# Patient Record
Sex: MALE | Race: BLACK OR AFRICAN AMERICAN
[De-identification: names, ages, dates, MRNs, and addresses within clinical notes are randomized per-mention and may not be internally consistent; named-entity substitution may affect disease eponyms.]

---

## 2017-01-17 NOTE — ED EAR COMPLAINT
History of Present Illness
 
General
Chief Complaint: Ear Complaints
Stated Complaint: left EAR PAIN
Source: patient
Exam Limitations: no limitations
 
Vital Signs & Intake/Output
Vital Signs & Intake/Output
 Vital Signs
 
 
Date Time Temp Pulse Resp B/P Pulse O2 O2 Flow FiO2
 
     Ox Delivery Rate 
 
01/17 1720 96.9 81 20 124/77 98 Room Air  
 
 
Allergies
Coded Allergies:
No Known Allergies (01/17/17)
 
Reconcile Medications
Amoxicillin (Amoxil) 500 MG CAP   1 TAB PO TID SINUSITIS
Amoxicillin/Potassium Clav (Augmentin 875-125 Tablet) 875 MG-125 MG TABLET   1 
TAB PO BID otitis media
Cephalexin (Keflex) 500 MG CAP   1 TAB PO TID .
Mometasone Furoate (Nasonex) 0.05 MG/Actuation SPR   2 SPRAY NICHOL DAILY SINUSITIS
Sulfamethoxazole/Trimethopri (Bactrim Ds 800 MG-160 MG) 1 TAB TAB   1 TAB PO BID
.
 
Triage Note:
TRIAGE: PT TO ER C/C L EAR PAIN X 3-4 DAYS.
CONSTANT.  TRIED TYLENOL WITH SOME TEMPORARY
RELIEF NOTED.
Triage Nurses Notes Reviewed? yes
Onset: Abrupt
Duration: day(s): (few), constant, continues in ED
Timing: recent history
Injury Environment: home
No Modifying Factors: none
HPI:
27-year-old male comes into emergency room with complaints of left ear pain is 
been going on for the past few days.  Sharp throbbing pain.  Continuous.  
Nonradiating.  Denies any fever or chills.  Denies any runny nose congestion.  
Denies any other associated symptoms.
 
Past History
 
Travel History
Traveled to [place] past 21 day No
 
Medical History
Any Pertinent Medical History? see below for history
Neurological: NONE
EENT: NONE
Cardiovascular: NONE
Respiratory: NONE
Gastrointestinal: NONE
Hepatic: NONE
Renal: NONE
Musculoskeletal: NONE
Psychiatric: NONE
Endocrine: NONE
Blood Disorders: NONE
Cancer(s): NONE
GYN/Reproductive: NONE
 
Surgical History
Surgical History: non-contributory
 
Psychosocial History
What is your primary language English
Tobacco Use: Never used
ETOH Use: occasional use
Illicit Drug Use: denies illicit drug use
 
Family History
Hx Contributory? No
 
Review of Systems
 
Review of Systems
Constitutional:
Reports: no symptoms. 
EENTM:
Reports: see HPI. 
Respiratory:
Reports: no symptoms. 
Cardiovascular:
Reports: no symptoms. 
GI:
Reports: no symptoms. 
Genitourinary:
Reports: no symptoms. 
Musculoskeletal:
Reports: no symptoms. 
Skin:
Reports: no symptoms. 
Neurological/Psychological:
Reports: no symptoms. 
Hematologic/Endocrine:
Reports: no symptoms. 
Immunologic/Allergic:
Reports: no symptoms. 
All Other Systems: Reviewed and Negative
 
Physical Exam
 
Physical Exam
General Appearance: well developed/nourished, mild distress
Head: atraumatic
Eyes:
Bilateral: normal appearance. 
Ears:
Left: Tympanic dull, Tympanic red. 
Nose: normal inspection
Mouth/Throat: normal mouth inspection
Neck: normal inspection
Cardiovascular/Respiratory: no respiratory distress
Back: normal inspection
Neurologic/Psych: awake, alert, oriented x 3, normal mood/affect
Skin: intact, normal color, warm/dry
 
Progress
Differential Diagnoses
I considered the following diagnoses in my evaluation of the patient:  Otitis 
media, otitis externa, mastoiditis, foreign body, cerumen impaction
 
Plan of Care:
Nontoxic-appearing.  In no apparent distress.  Started on Augmentin.
Initial ED EKG: none
 
Departure
 
Departure
Disposition: HOME OR SELF CARE
Condition: Stable
Clinical Impression
Primary Impression: Otitis media
Referrals:
MANJEET ORELLANA (PCP/Family)
 
Additional Instructions:
Take Augmentin as prescribed.  Follow-up with primary care doctor.  Return if 
any concerns worsening symptoms.  Motrin for pain.  dubrex ear wax 
softening drops after course of antibiotics. 
 
Please go over all results of today's visit with your primary care doctor.  
Contact your primary care doctor to let them know you were here in the emergency
room.  There may be nonspecific findings which may not be related to your visit 
today here in the emergency room but may require further evaluation and chronic 
monitoring by your primary care doctor.  If you had a laceration today the 
chance of foreign body always remains. You should follow-up with your primary 
care doctor for recheck in 3-5 days for a wound check.  If you had an x-ray done
there is a chance that a fracture could have been missed on initial read and you
should follow-up with your primary care doctor for repeat x-rays if symptoms 
persist.  If your blood pressure was elevated here in the emergency room please 
have rechecked by her primary care doctor within the next 48 hours by your 
primary care doctor.  If you were prescribed a narcotic here in the emergency 
room or any type of controlled substances you're not allowed to drive while 
taking this medication or operate any type of heavy machinery.  Narcotics can 
make you feel lightheaded dizziness nausea and can cause constipation.  You may 
need to  a stool softener.  Thank you for choosing [facility] 
emergency room.  Please return to the emergency room immediately if you have any
other concerns worsening of symptoms.
 
Departure Forms:
Customer Survey
General Discharge Information
Prescriptions:
Current Visit Scripts
Amoxicillin/Potassium Clav (Augmentin 875-125 Tablet) 1 TAB PO BID 
     #20 TAB

## 2018-04-02 ENCOUNTER — HOSPITAL ENCOUNTER (EMERGENCY)
Dept: HOSPITAL 68 - ERH | Age: 28
End: 2018-04-02
Payer: COMMERCIAL

## 2018-04-02 VITALS — BODY MASS INDEX: 38.57 KG/M2 | HEIGHT: 66 IN | WEIGHT: 240 LBS

## 2018-04-02 VITALS — SYSTOLIC BLOOD PRESSURE: 127 MMHG | DIASTOLIC BLOOD PRESSURE: 83 MMHG

## 2018-04-02 DIAGNOSIS — R31.9: Primary | ICD-10-CM

## 2018-04-02 NOTE — ED GI/GU/ABDOMINAL COMPLAINT
History of Present Illness
 
General
Chief Complaint: Male Genitourinary Problems
Stated Complaint: BLD IN URINE
Source: patient
Exam Limitations: no limitations
 
Vital Signs & Intake/Output
Vital Signs & Intake/Output
 Vital Signs
 
 
Date Time Temp Pulse Resp B/P B/P Pulse O2 O2 Flow FiO2
 
     Mean Ox Delivery Rate 
 
04/02 1123 98.8 89 16 127/83  98 Room Air  
 
 
 
Allergies
Coded Allergies:
No Known Allergies (01/17/17)
 
Reconcile Medications
No Known Home Medications
 
Triage Note:
PT PRESENTS TO THE ER STATES THAT HE WENT TO THE
 WALK IN ON THURSDAY FOR MED CARD AND THEY WOULD
 NOT GIVE IT TO HIM BECAUSE BLOOD IN URINE.  SO PT
 WAS TOLD TO DRINK WATER AND COME BACK FOR TEST SAT
 AND WALK IN STATES THAT BLOOD IS STILL IN URINE.
 PT DOES NOT HAVE A PCP SO PT PRESENTS TO THE ER TO
 GET EVALUATED FOR BLOOD IN URINE.  PT DENIES ANY
 OTHER SYMPTOMS,.
Triage Nurses Notes Reviewed? yes
Onset: Gradual
Duration: constant
Timing: recent history
Severity Numbers: 1
Location: unknown
Radiation: no radiation
HPI:
Patient is a 28-year-old male with an unremarkable past medical history who 
presents emergency room stating that he went to an urgent care facility this 
last Thursday for evaluation and medical clearance for A [employer] license medical 
card where he was asymptomatic and during the evaluation the provider noted from
a urinalysis hematuria and small amounts where he was advised to follow up on 
Saturday, patient did this and still noted the same small amounts of hematuria.
Patient does state that on Friday he had right-sided upper abdomen pain that 
lasted for about 20 minutes however described as aching sensation in which she 
has not had no symptoms since.
Patient denies any symptoms currently denies any abdominal pain nausea vomiting 
testicular pain or swelling urethral discharge hematuria dysuria or change in 
frequency of urination.
(Niraj Doyle)
 
Past History
 
Travel History
Traveled to [place] past 21 day No
 
Medical History
Any Pertinent Medical History? none
Neurological: NONE
EENT: NONE
Cardiovascular: NONE
Respiratory: NONE
Gastrointestinal: NONE
Hepatic: NONE
Renal: NONE
Musculoskeletal: NONE
Psychiatric: NONE
Endocrine: NONE
Blood Disorders: NONE
Cancer(s): NONE
GYN/Reproductive: NONE
 
Surgical History
Surgical History: tonsillectomy 10/5/17
 
Psychosocial History
What is your primary language English
Tobacco Use: Never used
 
Family History
Hx Contributory? No
(Niraj Doyle)
 
Review of Systems
 
Review of Systems
Constitutional:
Reports: no symptoms. 
EENTM:
Reports: no symptoms. 
Respiratory:
Reports: no symptoms. 
Cardiovascular:
Reports: no symptoms. 
GI:
Reports: no symptoms. 
Genitourinary:
Reports: see HPI. 
Musculoskeletal:
Reports: no symptoms. 
Skin:
Reports: no symptoms. 
Neurological/Psychological:
Reports: no symptoms. 
Hematologic/Endocrine:
Reports: no symptoms. 
Immunologic/Allergic:
Reports: no symptoms. 
All Other Systems: Reviewed and Negative
(Niraj Doyle)
 
Physical Exam
 
Physical Exam
General Appearance: no apparent distress, alert, comfortable
Head: atraumatic
Eyes:
Bilateral: normal appearance. 
Ears, Nose, Throat, Mouth: moist mucous membrane
Neck: normal inspection
Respiratory: no respiratory distress
Gastrointestinal: normal bowel sounds, soft, non-tender
Back: normal inspection, normal range of motion
Extremities: normal range of motion
 
Core Measures
ACS in differential dx? No
Sepsis Present: No
Sepsis Focused Exam Completed? No
(Niraj Doyle)
 
Progress
Differential Diagnosis: AAA, appendicitis, colon cancer, cholecystitis, 
diverticulitis, epididymitis, ischemic bowel, inflamm bowel dis, orchitis, 
pancreatitis, prostatitis, peptic ulcer, PUD/GERD, perforated viscous, 
pyelonephritis, SBO, STD, testicular torsion, ureterolithiasis, urinary 
retention, urethritis, UTI/pyelo
Plan of Care:
 Orders
 
 
Procedure Date/time Status
 
Add-on Test (ER Only) 04/02 1305 Active
 
CULTURE,URINE 04/02 1222 Active
 
URINALYSIS 04/02 1128 Complete
 
 
 Laboratory Tests
 
 
04/02/18 1222:
Urine Color YEL, Urine Clarity CLEAR, Urine pH 6.0, Ur Specific Gravity 1.020, 
Urine Protein NEG, Urine Ketones NEG, Urine Nitrite NEG, Urine Bilirubin NEG, 
Urine Urobilinogen 0.2, Ur Leukocyte Esterase NEG, Ur Microscopic SEDIMENT 
EXAMINED, Urine RBC 1-3, Urine WBC 1-3  H, Ur Epithelial Cells FEW, Urine 
Bacteria FEW  H, Urine Mucus FEW, Urine Hemoglobin TRACE-INTACT  H, Urine 
Glucose NEG
 Microbiology
04/02 1222  URINE ROUT: Urine Culture - RECD
 
Patient on initial presentation was resting comfortable bedside no apparent 
distress nontender abdomen denies any symptoms urinalysis will be obtained 
patient was given [facility] practice profile pamphlet to follow up and 
establish a primary care doctor
 
 
Discussed disposition plan with patient discussed urine analysis with patient 
for follow-up with urology.
Urine culture pending
 
My suspicion of nephrolithiasis is low upon discharge patient looks well no 
apparent distress and will comply with discharge instructions and had no 
questions.
Initial ED EKG: none
(Niraj Doyle)
 
Departure
 
Departure
Disposition: HOME OR SELF CARE
Condition: Stable
Clinical Impression
Primary Impression: Hematuria
Referrals:
Chirag Obrien (PCP/Family)
 
Additional Instructions:
This week please follow up with urologist Dr. Andrade for further evaluation 
treatment, symptoms worsen return to emergency room
Departure Forms:
Customer Survey
General Discharge Information
Prescriptions:
Current Visit Scripts
No Known Home Medications     
 
(Niraj oDyle)
 
PA/NP Co-Sign Statement
Statement:
ED Attending supervision documentation-
 
 I saw and evaluated the patient. I have also reviewed all the pertinent lab 
results and diagnostic results. I agree with the findings and the plan of care 
as documented in the PA's/NP's documentation. 
 
x I have reviewed the ED Record and agree with the PA's/NP's documentation.
 
[] Additions or exceptions (if any) to the PAs/NP's note and plan are 
summarized below:
[]
 
(Krysta PERERA,Wander)